# Patient Record
Sex: FEMALE | Race: WHITE | URBAN - METROPOLITAN AREA
[De-identification: names, ages, dates, MRNs, and addresses within clinical notes are randomized per-mention and may not be internally consistent; named-entity substitution may affect disease eponyms.]

---

## 2017-01-04 ENCOUNTER — FOLLOW UP ESTABLISHED (OUTPATIENT)
Dept: URBAN - METROPOLITAN AREA CLINIC 24 | Facility: CLINIC | Age: 67
End: 2017-01-04
Payer: MEDICARE

## 2017-01-04 PROCEDURE — 92012 INTRM OPH EXAM EST PATIENT: CPT | Performed by: OPHTHALMOLOGY

## 2017-01-04 ASSESSMENT — INTRAOCULAR PRESSURE
OD: 16
OS: 16

## 2017-01-19 ENCOUNTER — Encounter (OUTPATIENT)
Dept: URBAN - METROPOLITAN AREA CLINIC 24 | Facility: CLINIC | Age: 67
End: 2017-01-19
Payer: MEDICARE

## 2017-01-19 DIAGNOSIS — Z01.818 ENCOUNTER FOR OTHER PREPROCEDURAL EXAMINATION: Primary | ICD-10-CM

## 2017-01-19 PROCEDURE — 99213 OFFICE O/P EST LOW 20 MIN: CPT | Performed by: PHYSICIAN ASSISTANT

## 2017-01-19 RX ORDER — ASPIRIN 81 MG/1
81 MG TABLET, COATED ORAL
Qty: 0 | Refills: 0 | Status: INACTIVE
Start: 2017-01-19 | End: 2019-07-01

## 2017-01-19 RX ORDER — MULTIVITAMIN WITH IRON
TABLET ORAL
Qty: 0 | Refills: 0 | Status: INACTIVE
Start: 2017-01-19 | End: 2019-07-01

## 2017-02-01 ENCOUNTER — SURGERY (OUTPATIENT)
Dept: URBAN - METROPOLITAN AREA SURGERY 12 | Facility: SURGERY | Age: 67
End: 2017-02-01
Payer: MEDICARE

## 2017-02-01 PROCEDURE — 66761 REVISION OF IRIS: CPT | Performed by: OPHTHALMOLOGY

## 2017-02-15 ENCOUNTER — SURGERY (OUTPATIENT)
Dept: URBAN - METROPOLITAN AREA SURGERY 12 | Facility: SURGERY | Age: 67
End: 2017-02-15
Payer: MEDICARE

## 2017-02-15 PROCEDURE — 66761 REVISION OF IRIS: CPT | Performed by: OPHTHALMOLOGY

## 2017-03-08 ENCOUNTER — FOLLOW UP ESTABLISHED (OUTPATIENT)
Dept: URBAN - METROPOLITAN AREA CLINIC 24 | Facility: CLINIC | Age: 67
End: 2017-03-08
Payer: MEDICARE

## 2017-03-08 DIAGNOSIS — H40.033 ANATOMICAL NARROW ANGLE, BILATERAL: Primary | ICD-10-CM

## 2017-03-08 PROCEDURE — 92012 INTRM OPH EXAM EST PATIENT: CPT | Performed by: OPHTHALMOLOGY

## 2017-03-08 ASSESSMENT — INTRAOCULAR PRESSURE
OD: 12
OS: 12

## 2018-05-24 ENCOUNTER — FOLLOW UP ESTABLISHED (OUTPATIENT)
Dept: URBAN - METROPOLITAN AREA CLINIC 24 | Facility: CLINIC | Age: 68
End: 2018-05-24
Payer: MEDICARE

## 2018-05-24 DIAGNOSIS — H52.4 PRESBYOPIA: ICD-10-CM

## 2018-05-24 PROCEDURE — 92014 COMPRE OPH EXAM EST PT 1/>: CPT | Performed by: OPTOMETRIST

## 2018-05-24 PROCEDURE — 92015 DETERMINE REFRACTIVE STATE: CPT | Performed by: OPTOMETRIST

## 2018-05-24 ASSESSMENT — INTRAOCULAR PRESSURE
OS: 13
OD: 13

## 2018-05-24 ASSESSMENT — KERATOMETRY
OD: 44.50
OS: 44.20

## 2018-05-24 ASSESSMENT — VISUAL ACUITY
OD: 20/20
OS: 20/20

## 2019-07-01 ENCOUNTER — OFFICE VISIT (OUTPATIENT)
Dept: URBAN - METROPOLITAN AREA CLINIC 29 | Facility: CLINIC | Age: 69
End: 2019-07-01
Payer: MEDICARE

## 2019-07-01 PROCEDURE — 99202 OFFICE O/P NEW SF 15 MIN: CPT | Performed by: OPTOMETRIST

## 2019-07-01 NOTE — IMPRESSION/PLAN
Impression: Chalazion left upper eyelid: H00.14. OS. Plan: Discussed diagnosis in detail with patient. Discussed treatment options with patient. Lid scrubs and hygiene were explained.  Patient instructed to use artificial tears as needed

## 2019-07-09 ENCOUNTER — OFFICE VISIT (OUTPATIENT)
Dept: URBAN - METROPOLITAN AREA CLINIC 29 | Facility: CLINIC | Age: 69
End: 2019-07-09
Payer: MEDICARE

## 2019-07-09 DIAGNOSIS — H00.14 CHALAZION LEFT UPPER EYELID: Primary | ICD-10-CM

## 2019-07-09 PROCEDURE — 99213 OFFICE O/P EST LOW 20 MIN: CPT | Performed by: OPTOMETRIST

## 2019-07-09 NOTE — IMPRESSION/PLAN
Impression: Chalazion left upper eyelid: H00.14 OS. Plan: Discussed diagnosis in detail with patient. Discussed treatment options with patient. Lid scrubs and hygiene were explained.  Patient instructed to use artificial tears as needed

## 2021-03-19 ENCOUNTER — OFFICE VISIT (OUTPATIENT)
Dept: URBAN - METROPOLITAN AREA CLINIC 13 | Facility: CLINIC | Age: 71
End: 2021-03-19
Payer: MEDICARE

## 2021-03-19 PROCEDURE — 99204 OFFICE O/P NEW MOD 45 MIN: CPT | Performed by: OPHTHALMOLOGY

## 2021-03-19 PROCEDURE — 67515 INJECT/TREAT EYE SOCKET: CPT | Performed by: OPHTHALMOLOGY

## 2021-03-19 PROCEDURE — 92134 CPTRZ OPH DX IMG PST SGM RTA: CPT | Performed by: OPHTHALMOLOGY

## 2021-03-19 ASSESSMENT — INTRAOCULAR PRESSURE
OS: 11
OD: 15

## 2021-03-19 NOTE — IMPRESSION/PLAN
Impression: Cystoid macular edema following cataract surgery, bilateral: H59.033. Bilateral.
OCT: severe CME OU
OD: 448 microns OS: 537 microns Plan: --exam/OCT reveal persistent CME OU despite topical tx
--r/b/a of PSTK d/w pt, inc infection, pain, vision loss, RD, glaucoma, cataract, and globe perforation --pt elects PSTK OU, performed successfully w/o complication --pt instructed to call with s/s decreased vision/pain RTC 4 weeks for OCT OU re-eval

## 2021-03-19 NOTE — IMPRESSION/PLAN
Impression: Presence of pseudophakia: Z96.1.  Bilateral. Plan: --stable and centered IOL OU
--mild PCO OU
--follow

## 2021-05-04 ENCOUNTER — OFFICE VISIT (OUTPATIENT)
Dept: URBAN - METROPOLITAN AREA CLINIC 27 | Facility: CLINIC | Age: 71
End: 2021-05-04
Payer: MEDICARE

## 2021-05-04 PROCEDURE — 92134 CPTRZ OPH DX IMG PST SGM RTA: CPT | Performed by: OPHTHALMOLOGY

## 2021-05-04 PROCEDURE — 99213 OFFICE O/P EST LOW 20 MIN: CPT | Performed by: OPHTHALMOLOGY

## 2021-05-04 ASSESSMENT — INTRAOCULAR PRESSURE
OD: 16
OS: 15

## 2021-05-04 NOTE — IMPRESSION/PLAN
Impression: Cystoid macular edema following cataract surgery, bilateral: H59.033. Bilateral.
OCT: severe CME OU
OD: 272 from 448 microns OS: 264 from 537 microns s/p PSTK 3/19/21 Plan: --exam/OCT reveal resolved CME OU s/p PSTK OU
--IOP remains stable OU
--recommend observation OU
--pt instructed to call with s/s decreased vision/pain RTC mid-July for OCT OU re-eval

## 2021-09-02 ENCOUNTER — OFFICE VISIT (OUTPATIENT)
Dept: URBAN - METROPOLITAN AREA CLINIC 27 | Facility: CLINIC | Age: 71
End: 2021-09-02
Payer: MEDICARE

## 2021-09-02 DIAGNOSIS — Z96.1 PRESENCE OF PSEUDOPHAKIA: ICD-10-CM

## 2021-09-02 PROCEDURE — 67515 INJECT/TREAT EYE SOCKET: CPT | Performed by: OPHTHALMOLOGY

## 2021-09-02 PROCEDURE — 92134 CPTRZ OPH DX IMG PST SGM RTA: CPT | Performed by: OPHTHALMOLOGY

## 2021-09-02 PROCEDURE — 99214 OFFICE O/P EST MOD 30 MIN: CPT | Performed by: OPHTHALMOLOGY

## 2021-09-02 ASSESSMENT — INTRAOCULAR PRESSURE
OS: 10
OD: 13

## 2021-09-02 NOTE — IMPRESSION/PLAN
Impression: Cystoid macular edema following cataract surgery, bilateral: H59.033. Bilateral.
OCT: resolved CME OD, recurrent CME OS
S/P PSTK OU 3/19/21 Plan: --exam/OCT reveal resolved CME OD, recurrent CME OS
--IOP remains stable OU
--recommend SPTK OS today, RBA discussed --pt elects to proceed with PSTK OS today, no complications --pt instructed to call with s/s decreased vision/pain RTC 4-6 weeks for OCT OU re-eval

## 2021-11-16 ENCOUNTER — OFFICE VISIT (OUTPATIENT)
Dept: URBAN - METROPOLITAN AREA CLINIC 27 | Facility: CLINIC | Age: 71
End: 2021-11-16
Payer: MEDICARE

## 2021-11-16 DIAGNOSIS — H59.033 CYSTOID MACULAR EDEMA FOLLOWING CATARACT SURGERY, BILATERAL: Primary | ICD-10-CM

## 2021-11-16 PROCEDURE — 92134 CPTRZ OPH DX IMG PST SGM RTA: CPT | Performed by: OPHTHALMOLOGY

## 2021-11-16 PROCEDURE — 99213 OFFICE O/P EST LOW 20 MIN: CPT | Performed by: OPHTHALMOLOGY

## 2021-11-16 ASSESSMENT — INTRAOCULAR PRESSURE
OD: 13
OS: 10

## 2021-11-16 NOTE — IMPRESSION/PLAN
Impression: Cystoid macular edema following cataract surgery, bilateral: H59.033. Bilateral.
OCT: resolved CME OD, recurrent CME OS
S/P PSTK OD 3/19/21, OS 09/02/21 Plan: --exam/OCT reveal resolved CME OU
--IOP remains stable OU
--recommend observation --pt instructed to call with s/s dec vision/pain RTC 3 mo for OCT OU re-eval

## 2022-09-27 ENCOUNTER — OFFICE VISIT (OUTPATIENT)
Dept: URBAN - METROPOLITAN AREA CLINIC 27 | Facility: CLINIC | Age: 72
End: 2022-09-27
Payer: MEDICARE

## 2022-09-27 DIAGNOSIS — Z96.1 PRESENCE OF PSEUDOPHAKIA: ICD-10-CM

## 2022-09-27 DIAGNOSIS — H35.373 PUCKERING OF MACULA, BILATERAL: ICD-10-CM

## 2022-09-27 DIAGNOSIS — H43.812 VITREOUS DEGENERATION, LEFT EYE: ICD-10-CM

## 2022-09-27 DIAGNOSIS — H35.352 CYSTOID MACULAR DEGENERATION, LEFT EYE: Primary | ICD-10-CM

## 2022-09-27 PROCEDURE — 99214 OFFICE O/P EST MOD 30 MIN: CPT | Performed by: OPHTHALMOLOGY

## 2022-09-27 PROCEDURE — 92250 FUNDUS PHOTOGRAPHY W/I&R: CPT | Performed by: OPHTHALMOLOGY

## 2022-09-27 PROCEDURE — 92242 FLUORESCEIN&ICG ANGIOGRAPHY: CPT | Performed by: OPHTHALMOLOGY

## 2022-09-27 PROCEDURE — 92134 CPTRZ OPH DX IMG PST SGM RTA: CPT | Performed by: OPHTHALMOLOGY

## 2022-09-27 ASSESSMENT — INTRAOCULAR PRESSURE
OS: 11
OD: 11

## 2022-09-27 NOTE — IMPRESSION/PLAN
Impression: Cystoid macular degeneration OS Plan: Pt c/o worsening VA OS. Pt has h/o CME OS s/p IVK 3/19/21 and 9/2/21. Exam/OCT show recurrent CME OS. Photos 9/27/22 showed CME OS. FA sweeps 9/27/22 showed petalloid leakage OS>OD, mild disc stain OD, no evidence of vasculitis or chorioretinitis OU. ICG showed normal flow w/o blockage or stain OU. Unclear as to the reason for recurrent CME OS, as no inflammation is noted. Given good response to STK in past, recommend STK and Acular TID OS. May need to stay on Acular chronically. Also consider Optos FA/ICG to evaluate for subclinical inflammation peripherally. 

1 week, STK OS; then 4 weeks, OCT OU, reeval CME

## 2022-09-27 NOTE — IMPRESSION/PLAN
Impression: Puckering of macula OU Plan: Exam/OCT show moderate ERM OS and mild ERM OD. Reviewed options of obs vs PPV/MP, recommend obs at this time.

## 2022-10-03 ENCOUNTER — OFFICE VISIT (OUTPATIENT)
Dept: URBAN - METROPOLITAN AREA CLINIC 27 | Facility: CLINIC | Age: 72
End: 2022-10-03
Payer: MEDICARE

## 2022-10-03 DIAGNOSIS — H35.352 CYSTOID MACULAR DEGENERATION, LEFT EYE: Primary | ICD-10-CM

## 2022-10-03 PROCEDURE — 67515 INJECT/TREAT EYE SOCKET: CPT | Performed by: OPHTHALMOLOGY

## 2022-10-03 RX ORDER — KETOROLAC TROMETHAMINE 5 MG/ML
0.5 % SOLUTION OPHTHALMIC
Qty: 5 | Refills: 3 | Status: ACTIVE
Start: 2022-10-03

## 2022-10-03 ASSESSMENT — INTRAOCULAR PRESSURE
OD: 10
OS: 7

## 2022-11-21 ENCOUNTER — OFFICE VISIT (OUTPATIENT)
Dept: URBAN - METROPOLITAN AREA CLINIC 27 | Facility: CLINIC | Age: 72
End: 2022-11-21
Payer: MEDICARE

## 2022-11-21 DIAGNOSIS — H35.352 CYSTOID MACULAR DEGENERATION, LEFT EYE: Primary | ICD-10-CM

## 2022-11-21 DIAGNOSIS — H35.373 PUCKERING OF MACULA, BILATERAL: ICD-10-CM

## 2022-11-21 PROCEDURE — 92012 INTRM OPH EXAM EST PATIENT: CPT | Performed by: OPHTHALMOLOGY

## 2022-11-21 PROCEDURE — 92134 CPTRZ OPH DX IMG PST SGM RTA: CPT | Performed by: OPHTHALMOLOGY

## 2022-11-21 ASSESSMENT — INTRAOCULAR PRESSURE
OS: 10
OD: 13

## 2022-11-21 NOTE — IMPRESSION/PLAN
Impression: Cystoid macular degeneration OS
 - s/p IVK 3/19/21 and 9/2/21
 - s/p STK 10/3/22 Plan: Exam/OCT show resolved CME OS. Photos 9/27/22 showed CME OS. FA sweeps 9/27/22 showed petalloid leakage OS>OD, mild disc stain OD, no evidence of vasculitis or chorioretinitis OU. ICG showed normal flow w/o blockage or stain OU. Unclear as to the reason for recurrent CME OS, as no inflammation is noted. Reduce Acular to BID. May need to stay on Acular chronically. Also consider [employer] FA/ICG to evaluate for subclinical inflammation peripherally. Also consider Yutiq for any recurrence. 

4 weeks, OCT OU, reeval CME

## 2023-01-09 ENCOUNTER — OFFICE VISIT (OUTPATIENT)
Dept: URBAN - METROPOLITAN AREA CLINIC 27 | Facility: CLINIC | Age: 73
End: 2023-01-09
Payer: MEDICARE

## 2023-01-09 DIAGNOSIS — H35.352 CYSTOID MACULAR DEGENERATION, LEFT EYE: Primary | ICD-10-CM

## 2023-01-09 DIAGNOSIS — H35.373 PUCKERING OF MACULA, BILATERAL: ICD-10-CM

## 2023-01-09 PROCEDURE — 92134 CPTRZ OPH DX IMG PST SGM RTA: CPT | Performed by: OPHTHALMOLOGY

## 2023-01-09 PROCEDURE — 92012 INTRM OPH EXAM EST PATIENT: CPT | Performed by: OPHTHALMOLOGY

## 2023-01-09 ASSESSMENT — INTRAOCULAR PRESSURE
OS: 13
OD: 16

## 2023-01-09 NOTE — IMPRESSION/PLAN
Impression: Cystoid macular degeneration OS
 - s/p IVK 3/19/21 and 9/2/21
 - s/p STK 10/3/22 Plan: Pt stopped Acular due to intolerance. Exam/OCT show mild cystic changes OS, VA stable. FA sweeps 9/27/22 showed petalloid leakage OS>OD, mild disc stain OD, no evidence of vasculitis or chorioretinitis OU. ICG showed normal flow w/o blockage or stain OU. No additional Tx today. Consider [employer]os FA/ICG to evaluate for subclinical inflammation peripherally. Also consider Yutiq for any recurrence. 

3 months, OCT OU, reeval CME

## 2023-09-22 ENCOUNTER — OFFICE VISIT (OUTPATIENT)
Dept: URBAN - METROPOLITAN AREA CLINIC 27 | Facility: CLINIC | Age: 73
End: 2023-09-22
Payer: MEDICARE

## 2023-09-22 DIAGNOSIS — H04.129 DRY EYE SYNDROME OF LACRIMAL GLAND: ICD-10-CM

## 2023-09-22 DIAGNOSIS — H35.373 PUCKERING OF MACULA, BILATERAL: Primary | ICD-10-CM

## 2023-09-22 DIAGNOSIS — H35.352 CYSTOID MACULAR DEGENERATION, LEFT EYE: ICD-10-CM

## 2023-09-22 DIAGNOSIS — H35.3121 NONEXUDATIVE AGE-RELATED MACULAR DEGENERATION, LEFT EYE, EARLY DRY STAGE: ICD-10-CM

## 2023-09-22 DIAGNOSIS — Z96.1 PRESENCE OF PSEUDOPHAKIA: ICD-10-CM

## 2023-09-22 DIAGNOSIS — H43.812 VITREOUS DEGENERATION, LEFT EYE: ICD-10-CM

## 2023-09-22 PROCEDURE — 92134 CPTRZ OPH DX IMG PST SGM RTA: CPT | Performed by: OPHTHALMOLOGY

## 2023-09-22 PROCEDURE — 99214 OFFICE O/P EST MOD 30 MIN: CPT | Performed by: OPHTHALMOLOGY

## 2023-09-22 ASSESSMENT — INTRAOCULAR PRESSURE
OD: 17
OS: 14

## 2025-05-09 ENCOUNTER — OFFICE VISIT (OUTPATIENT)
Dept: URBAN - METROPOLITAN AREA CLINIC 27 | Facility: CLINIC | Age: 75
End: 2025-05-09
Payer: MEDICARE

## 2025-05-09 DIAGNOSIS — H35.373 PUCKERING OF MACULA, BILATERAL: Primary | ICD-10-CM

## 2025-05-09 DIAGNOSIS — H43.812 VITREOUS DEGENERATION, LEFT EYE: ICD-10-CM

## 2025-05-09 DIAGNOSIS — H35.352 CYSTOID MACULAR DEGENERATION, LEFT EYE: ICD-10-CM

## 2025-05-09 DIAGNOSIS — H04.129 DRY EYE SYNDROME OF LACRIMAL GLAND: ICD-10-CM

## 2025-05-09 DIAGNOSIS — H35.3121 NONEXUDATIVE AGE-RELATED MACULAR DEGENERATION, LEFT EYE, EARLY DRY STAGE: ICD-10-CM

## 2025-05-09 DIAGNOSIS — Z96.1 PRESENCE OF PSEUDOPHAKIA: ICD-10-CM

## 2025-05-09 PROCEDURE — 99214 OFFICE O/P EST MOD 30 MIN: CPT | Performed by: OPHTHALMOLOGY

## 2025-05-09 PROCEDURE — 92134 CPTRZ OPH DX IMG PST SGM RTA: CPT | Performed by: OPHTHALMOLOGY

## 2025-05-09 ASSESSMENT — INTRAOCULAR PRESSURE
OS: 21
OD: 21